# Patient Record
Sex: FEMALE | Race: WHITE | NOT HISPANIC OR LATINO | Employment: FULL TIME | URBAN - METROPOLITAN AREA
[De-identification: names, ages, dates, MRNs, and addresses within clinical notes are randomized per-mention and may not be internally consistent; named-entity substitution may affect disease eponyms.]

---

## 2017-01-27 ENCOUNTER — OFFICE VISIT (OUTPATIENT)
Dept: OBSTETRICS AND GYNECOLOGY | Facility: CLINIC | Age: 27
End: 2017-01-27

## 2017-01-27 VITALS — BODY MASS INDEX: 34.91 KG/M2 | HEIGHT: 63 IN | WEIGHT: 197 LBS

## 2017-01-27 DIAGNOSIS — Z12.4 SCREENING FOR MALIGNANT NEOPLASM OF CERVIX: ICD-10-CM

## 2017-01-27 DIAGNOSIS — Z13.9 SCREENING: ICD-10-CM

## 2017-01-27 DIAGNOSIS — Z01.419 ENCOUNTER FOR GYNECOLOGICAL EXAMINATION WITHOUT ABNORMAL FINDING: Primary | ICD-10-CM

## 2017-01-27 LAB
BILIRUB BLD-MCNC: NEGATIVE MG/DL
CLARITY, POC: CLEAR
COLOR UR: YELLOW
GLUCOSE UR STRIP-MCNC: NEGATIVE MG/DL
KETONES UR QL: NEGATIVE
LEUKOCYTE EST, POC: NEGATIVE
NITRITE UR-MCNC: NEGATIVE MG/ML
PH UR: 6 [PH] (ref 5–8)
PROT UR STRIP-MCNC: NEGATIVE MG/DL
RBC # UR STRIP: NEGATIVE /UL
SP GR UR: 1.03 (ref 1–1.03)
UROBILINOGEN UR QL: NORMAL

## 2017-01-27 PROCEDURE — 81002 URINALYSIS NONAUTO W/O SCOPE: CPT | Performed by: OBSTETRICS & GYNECOLOGY

## 2017-01-27 PROCEDURE — 99395 PREV VISIT EST AGE 18-39: CPT | Performed by: OBSTETRICS & GYNECOLOGY

## 2017-01-27 NOTE — PROGRESS NOTES
"[facility] Obstetrics and Gynecology    GYN ANNUAL EXAM:  Chief Complaint   Patient presents with   • Gynecologic Exam     last pap 10/9/15 negative       Subjective   History of Present Illness    Diana Moss is a 26 y.o. female who presents for annual exam.  Diana had an ablation of the endometrium as had no bleeding since.  She is very happy with her results.  The kids are doing well.  Her youngest is now 3 years old.  She has no gynecologic complaints today and no new medical history.    Obstetric History:  OB History      Para Term  AB TAB SAB Ectopic Multiple Living    4 3 3  1  1   3         Menstrual History:     No LMP recorded.       Sexual History:active         Family history of breast cancer: no  Family history of ovarian cancer: no  Family history of uterine cancer: no  Family History of cervical cancer: no  Family History of colon cancer/colon polyps: no  Regular self breast exam: yes  Current contraception:TUBAL LIGATION  History of abnormal Pap smear: yes - KERRI 1   Received Gardasil immunization: No  JASMIN exposure in utero: no  History of abnormal mammogram: no    The following portions of the patient's history were reviewed and updated as appropriate: allergies, current medications, past family history, past medical history, past social history, past surgical history and problem list.    Review of Systems    Pertinent items are noted in HPI.       Objective   Physical Exam    Visit Vitals   • Ht 62.5\" (158.8 cm)   • Wt 197 lb (89.4 kg)   • BMI 35.46 kg/m2       General:   alert, appears stated age and cooperative   Heart: regular rate and rhythm, S1, S2 normal, no murmur, click, rub or gallop   Lungs: clear to auscultation bilaterally   Abdomen: soft, non-tender, without masses or organomegaly   Breast: inspection negative, no nipple discharge or bleeding, no masses or nodularity palpable   Vulva: normal   Vagina: normal mucosa, normal discharge   Cervix: " no bleeding following Pap, no cervical motion tenderness and no lesions   Uterus: normal size, midline, anteverted, non-tender, mobile   Adnexa: normal adnexa and no mass, fullness, tenderness     Assessment/Plan   Diana was seen today for gynecologic exam.    Diagnoses and all orders for this visit:    Screening  -     POC Urinalysis Dipstick    Screening for malignant neoplasm of cervix  -     IGP, Rfx Aptima HPV ASCU        Thin prep Pap smear.  Mammogram.  Contraception: TUBAL LIGATION.  All questions answered.  Await pap smear results.  Follow up in 1 year.               Stacy Mckinley MD,  FACOG

## 2017-01-27 NOTE — MR AVS SNAPSHOT
Diana Mahoneybrandon   1/27/2017 2:30 PM   Office Visit    Dept Phone:  [phone]   Encounter #:  [id number]    Provider:  Stacy Mckinley MD   Department:  [facility] OB GYN                Your Full Care Plan              Today's Medication Changes          These changes are accurate as of: 1/27/17  3:09 PM.  If you have any questions, ask your nurse or doctor.               Stop taking medication(s)listed here:     amoxicillin-clavulanate 875-125 MG per tablet   Commonly known as:  AUGMENTIN   Stopped by:  Stacy Mckinley MD           cefdinir 300 MG capsule   Commonly known as:  OMNICEF   Stopped by:  Stacy Mckinley MD                      Your Updated Medication List          This list is accurate as of: 1/27/17  3:09 PM.  Always use your most recent med list.                cetirizine 10 MG tablet   Commonly known as:  zyrTEC       Chlorcyclizine-Pseudoephed 25-60 MG tablet   Commonly known as:  STAHIST AD   Take 1 tablet po tid prn       Chlorpheniramine-Codeine 2-9 MG/5ML liquid   Commonly known as:  Z-TUSS AC   Take 1-2 tsp po q6 hours prn       phentermine 37.5 MG capsule       traMADol 50 MG tablet   Commonly known as:  ULTRAM   Take 1 tablet by mouth every 4 (four) hours as needed for moderate pain (4-6).               We Performed the Following     IGP, Rfx Aptima HPV ASCU     POC Urinalysis Dipstick       You Were Diagnosed With        Codes Comments    Encounter for gynecological examination without abnormal finding    -  Primary ICD-10-CM: Z01.419  ICD-9-CM: V72.31     Screening for malignant neoplasm of cervix     ICD-10-CM: Z12.4  ICD-9-CM: V76.2     Screening     ICD-10-CM: Z13.9  ICD-9-CM: V82.9       Medications to be Given to You by a Medical Professional     Due       Frequency    8/4/2016 neomycin-polymyxin-hydrocortisone (CORTISPORIN) 1 % otic solution solution 2-3 drop  4 Times Daily      Instructions     None  "   Patient Instructions History      Upcoming Appointments     Visit Type Date Time Department    OFFICE VISIT 2017  2:30 PM MARIVEL RADFORD      [employer] Signup     [facility] [employer] allows you to send messages to your doctor, view your test results, renew your prescriptions, schedule appointments, and more. To sign up, go to [employer] and click on the Sign Up Now link in the New User? box. Enter your [employer] Activation Code exactly as it appears below along with the last four digits of your Social Security Number and your Date of Birth () to complete the sign-up process. If you do not sign up before the expiration date, you must request a new code.    [employer] Activation Code: [email or web address]  Expires: 2/10/2017  3:09 PM    If you have questions, you can email [email or web address] or call [phone] to talk to our [employer] staff. Remember, [employer] is NOT to be used for urgent needs. For medical emergencies, dial 911.               Other Info from Your Visit           Allergies     Codeine  Itching, Nausea Only, Nausea And Vomiting    Hydrocodone-acetaminophen  Nausea And Vomiting    Latex        Reason for Visit     Gynecologic Exam last pap 10/9/15 negative      Vital Signs     Height Weight Body Mass Index Smoking Status          62.5\" (158.8 cm) 197 lb (89.4 kg) 35.46 kg/m2 Former Smoker        Problems and Diagnoses Noted     Encounter for routine gynecological examination    -  Primary    Screening for cervical cancer        Screening          Results     POC Urinalysis Dipstick      Component Value Standard Range & Units    Color Yellow Yellow, Straw, Dark Yellow, Beth    Clarity, UA Clear Clear    Glucose, UA Negative Negative, 1000 mg/dL (3+) mg/dL    Bilirubin Negative Negative    Ketones, UA Negative Negative    Specific Gravity  1.030 1.005 - 1.030    Blood, UA Negative Negative    pH, Urine 6.0 5.0 - 8.0    Protein, POC Negative Negative mg/dL    " Urobilinogen, UA Normal Normal    Leukocytes Negative Negative    Nitrite, UA Negative Negative

## 2017-01-31 ENCOUNTER — TELEPHONE (OUTPATIENT)
Dept: OBSTETRICS AND GYNECOLOGY | Facility: CLINIC | Age: 27
End: 2017-01-31

## 2017-01-31 LAB
CONV .: NORMAL
CYTOLOGIST CVX/VAG CYTO: NORMAL
CYTOLOGY CVX/VAG DOC THIN PREP: NORMAL
DX ICD CODE: NORMAL
HIV 1 & 2 AB SER-IMP: NORMAL
OTHER STN SPEC: NORMAL
PATH REPORT.FINAL DX SPEC: NORMAL
STAT OF ADQ CVX/VAG CYTO-IMP: NORMAL

## 2017-01-31 NOTE — TELEPHONE ENCOUNTER
----- Message from Stacy Mckinley MD sent at 1/31/2017 11:45 AM EST -----  Congratulations your pap testing is normal      Thank you  Stacy Mckinley MD

## 2021-04-16 ENCOUNTER — BULK ORDERING (OUTPATIENT)
Dept: CASE MANAGEMENT | Facility: OTHER | Age: 31
End: 2021-04-16

## 2021-04-16 DIAGNOSIS — Z23 IMMUNIZATION DUE: ICD-10-CM

## 2023-03-07 RX ORDER — OMEPRAZOLE 20 MG/1
20 CAPSULE, DELAYED RELEASE ORAL DAILY
COMMUNITY

## 2023-03-07 RX ORDER — HYDROCODONE BITARTRATE AND ACETAMINOPHEN 5; 325 MG/1; MG/1
1 TABLET ORAL EVERY 6 HOURS PRN
COMMUNITY
End: 2023-03-20

## 2023-03-08 ENCOUNTER — HOSPITAL ENCOUNTER (OUTPATIENT)
Facility: HOSPITAL | Age: 33
Setting detail: HOSPITAL OUTPATIENT SURGERY
Discharge: HOME | End: 2023-03-08
Attending: UROLOGY | Admitting: UROLOGY
Payer: COMMERCIAL

## 2023-03-08 ENCOUNTER — ANESTHESIA EVENT (OUTPATIENT)
Dept: PERIOP | Facility: HOSPITAL | Age: 33
End: 2023-03-08
Payer: COMMERCIAL

## 2023-03-08 ENCOUNTER — ANESTHESIA (OUTPATIENT)
Dept: PERIOP | Facility: HOSPITAL | Age: 33
End: 2023-03-08
Payer: COMMERCIAL

## 2023-03-08 ENCOUNTER — APPOINTMENT (OUTPATIENT)
Dept: GENERAL RADIOLOGY | Facility: HOSPITAL | Age: 33
End: 2023-03-08
Payer: COMMERCIAL

## 2023-03-08 VITALS
RESPIRATION RATE: 16 BRPM | HEART RATE: 61 BPM | HEIGHT: 62 IN | BODY MASS INDEX: 28.34 KG/M2 | TEMPERATURE: 98.7 F | WEIGHT: 154 LBS | DIASTOLIC BLOOD PRESSURE: 78 MMHG | OXYGEN SATURATION: 99 % | SYSTOLIC BLOOD PRESSURE: 121 MMHG

## 2023-03-08 DIAGNOSIS — N23 RENAL COLIC ON LEFT SIDE: Primary | ICD-10-CM

## 2023-03-08 DIAGNOSIS — N20.1 LEFT URETERAL STONE: ICD-10-CM

## 2023-03-08 PROCEDURE — 25010000002 PROPOFOL 10 MG/ML EMULSION: Performed by: NURSE ANESTHETIST, CERTIFIED REGISTERED

## 2023-03-08 PROCEDURE — C1769 GUIDE WIRE: HCPCS | Performed by: UROLOGY

## 2023-03-08 PROCEDURE — 25010000002 DEXAMETHASONE SODIUM PHOSPHATE 20 MG/5ML SOLUTION: Performed by: NURSE ANESTHETIST, CERTIFIED REGISTERED

## 2023-03-08 PROCEDURE — 25010000002 FENTANYL CITRATE (PF) 50 MCG/ML SOLUTION: Performed by: NURSE ANESTHETIST, CERTIFIED REGISTERED

## 2023-03-08 PROCEDURE — 0 CEFAZOLIN PER 500 MG: Performed by: UROLOGY

## 2023-03-08 PROCEDURE — 25010000002 ONDANSETRON PER 1 MG: Performed by: NURSE ANESTHETIST, CERTIFIED REGISTERED

## 2023-03-08 PROCEDURE — C2617 STENT, NON-COR, TEM W/O DEL: HCPCS | Performed by: UROLOGY

## 2023-03-08 PROCEDURE — 25010000002 MIDAZOLAM PER 1 MG: Performed by: ANESTHESIOLOGY

## 2023-03-08 PROCEDURE — 74420 UROGRAPHY RTRGR +-KUB: CPT

## 2023-03-08 DEVICE — URETERAL STENT
Type: IMPLANTABLE DEVICE | Site: URETER | Status: FUNCTIONAL
Brand: CONTOUR™

## 2023-03-08 RX ORDER — CEFAZOLIN 1 G/1
2 INJECTION, POWDER, FOR SOLUTION INTRAVENOUS ONCE
Status: COMPLETED | OUTPATIENT
Start: 2023-03-08 | End: 2023-03-08

## 2023-03-08 RX ORDER — DROPERIDOL 2.5 MG/ML
0.62 INJECTION, SOLUTION INTRAMUSCULAR; INTRAVENOUS
Status: DISCONTINUED | OUTPATIENT
Start: 2023-03-08 | End: 2023-03-08 | Stop reason: HOSPADM

## 2023-03-08 RX ORDER — FAMOTIDINE 10 MG/ML
20 INJECTION, SOLUTION INTRAVENOUS ONCE
Status: DISCONTINUED | OUTPATIENT
Start: 2023-03-08 | End: 2023-03-08

## 2023-03-08 RX ORDER — PROPOFOL 10 MG/ML
VIAL (ML) INTRAVENOUS AS NEEDED
Status: DISCONTINUED | OUTPATIENT
Start: 2023-03-08 | End: 2023-03-08 | Stop reason: SURG

## 2023-03-08 RX ORDER — SODIUM CHLORIDE 0.9 % (FLUSH) 0.9 %
3-10 SYRINGE (ML) INJECTION AS NEEDED
Status: DISCONTINUED | OUTPATIENT
Start: 2023-03-08 | End: 2023-03-08 | Stop reason: HOSPADM

## 2023-03-08 RX ORDER — FENTANYL CITRATE 50 UG/ML
50 INJECTION, SOLUTION INTRAMUSCULAR; INTRAVENOUS
Status: DISCONTINUED | OUTPATIENT
Start: 2023-03-08 | End: 2023-03-08 | Stop reason: HOSPADM

## 2023-03-08 RX ORDER — FLUMAZENIL 0.1 MG/ML
0.2 INJECTION INTRAVENOUS AS NEEDED
Status: DISCONTINUED | OUTPATIENT
Start: 2023-03-08 | End: 2023-03-08 | Stop reason: HOSPADM

## 2023-03-08 RX ORDER — ONDANSETRON 2 MG/ML
4 INJECTION INTRAMUSCULAR; INTRAVENOUS ONCE AS NEEDED
Status: DISCONTINUED | OUTPATIENT
Start: 2023-03-08 | End: 2023-03-08 | Stop reason: HOSPADM

## 2023-03-08 RX ORDER — NALOXONE HCL 0.4 MG/ML
0.2 VIAL (ML) INJECTION AS NEEDED
Status: DISCONTINUED | OUTPATIENT
Start: 2023-03-08 | End: 2023-03-08 | Stop reason: HOSPADM

## 2023-03-08 RX ORDER — MIDAZOLAM HYDROCHLORIDE 1 MG/ML
1 INJECTION INTRAMUSCULAR; INTRAVENOUS
Status: DISCONTINUED | OUTPATIENT
Start: 2023-03-08 | End: 2023-03-08 | Stop reason: HOSPADM

## 2023-03-08 RX ORDER — PROMETHAZINE HYDROCHLORIDE 25 MG/1
25 TABLET ORAL ONCE AS NEEDED
Status: DISCONTINUED | OUTPATIENT
Start: 2023-03-08 | End: 2023-03-08 | Stop reason: HOSPADM

## 2023-03-08 RX ORDER — SODIUM CHLORIDE 0.9 % (FLUSH) 0.9 %
3 SYRINGE (ML) INJECTION EVERY 12 HOURS SCHEDULED
Status: DISCONTINUED | OUTPATIENT
Start: 2023-03-08 | End: 2023-03-08 | Stop reason: HOSPADM

## 2023-03-08 RX ORDER — IPRATROPIUM BROMIDE AND ALBUTEROL SULFATE 2.5; .5 MG/3ML; MG/3ML
3 SOLUTION RESPIRATORY (INHALATION) ONCE AS NEEDED
Status: DISCONTINUED | OUTPATIENT
Start: 2023-03-08 | End: 2023-03-08 | Stop reason: HOSPADM

## 2023-03-08 RX ORDER — PHENAZOPYRIDINE HYDROCHLORIDE 100 MG/1
100 TABLET, FILM COATED ORAL 3 TIMES DAILY PRN
Qty: 21 TABLET | Refills: 0 | Status: SHIPPED | OUTPATIENT
Start: 2023-03-08

## 2023-03-08 RX ORDER — HYDROCODONE BITARTRATE AND ACETAMINOPHEN 5; 325 MG/1; MG/1
1-2 TABLET ORAL EVERY 4 HOURS PRN
Qty: 20 TABLET | Refills: 0 | Status: SHIPPED | OUTPATIENT
Start: 2023-03-08 | End: 2023-03-20

## 2023-03-08 RX ORDER — MAGNESIUM HYDROXIDE 1200 MG/15ML
LIQUID ORAL AS NEEDED
Status: DISCONTINUED | OUTPATIENT
Start: 2023-03-08 | End: 2023-03-08 | Stop reason: HOSPADM

## 2023-03-08 RX ORDER — LABETALOL HYDROCHLORIDE 5 MG/ML
5 INJECTION, SOLUTION INTRAVENOUS
Status: DISCONTINUED | OUTPATIENT
Start: 2023-03-08 | End: 2023-03-08 | Stop reason: HOSPADM

## 2023-03-08 RX ORDER — HYDROCODONE BITARTRATE AND ACETAMINOPHEN 7.5; 325 MG/1; MG/1
1 TABLET ORAL ONCE AS NEEDED
Status: COMPLETED | OUTPATIENT
Start: 2023-03-08 | End: 2023-03-08

## 2023-03-08 RX ORDER — HYDROMORPHONE HYDROCHLORIDE 1 MG/ML
0.5 INJECTION, SOLUTION INTRAMUSCULAR; INTRAVENOUS; SUBCUTANEOUS
Status: DISCONTINUED | OUTPATIENT
Start: 2023-03-08 | End: 2023-03-08 | Stop reason: HOSPADM

## 2023-03-08 RX ORDER — FAMOTIDINE 10 MG/ML
INJECTION, SOLUTION INTRAVENOUS ONCE
Status: CANCELLED | OUTPATIENT
Start: 2023-03-08 | End: 2023-03-08

## 2023-03-08 RX ORDER — ONDANSETRON 4 MG/1
4 TABLET, FILM COATED ORAL DAILY PRN
Qty: 10 TABLET | Refills: 1 | Status: SHIPPED | OUTPATIENT
Start: 2023-03-08

## 2023-03-08 RX ORDER — SCOLOPAMINE TRANSDERMAL SYSTEM 1 MG/1
1 PATCH, EXTENDED RELEASE TRANSDERMAL ONCE
Status: DISCONTINUED | OUTPATIENT
Start: 2023-03-08 | End: 2023-03-08 | Stop reason: HOSPADM

## 2023-03-08 RX ORDER — DEXAMETHASONE SODIUM PHOSPHATE 4 MG/ML
INJECTION, SOLUTION INTRA-ARTICULAR; INTRALESIONAL; INTRAMUSCULAR; INTRAVENOUS; SOFT TISSUE AS NEEDED
Status: DISCONTINUED | OUTPATIENT
Start: 2023-03-08 | End: 2023-03-08 | Stop reason: SURG

## 2023-03-08 RX ORDER — EPHEDRINE SULFATE 50 MG/ML
5 INJECTION, SOLUTION INTRAVENOUS ONCE AS NEEDED
Status: DISCONTINUED | OUTPATIENT
Start: 2023-03-08 | End: 2023-03-08 | Stop reason: HOSPADM

## 2023-03-08 RX ORDER — LIDOCAINE HYDROCHLORIDE 20 MG/ML
INJECTION, SOLUTION INFILTRATION; PERINEURAL AS NEEDED
Status: DISCONTINUED | OUTPATIENT
Start: 2023-03-08 | End: 2023-03-08 | Stop reason: SURG

## 2023-03-08 RX ORDER — PROMETHAZINE HYDROCHLORIDE 25 MG/1
25 SUPPOSITORY RECTAL ONCE AS NEEDED
Status: DISCONTINUED | OUTPATIENT
Start: 2023-03-08 | End: 2023-03-08 | Stop reason: HOSPADM

## 2023-03-08 RX ORDER — HYDRALAZINE HYDROCHLORIDE 20 MG/ML
5 INJECTION INTRAMUSCULAR; INTRAVENOUS
Status: DISCONTINUED | OUTPATIENT
Start: 2023-03-08 | End: 2023-03-08 | Stop reason: HOSPADM

## 2023-03-08 RX ORDER — FENTANYL CITRATE 50 UG/ML
INJECTION, SOLUTION INTRAMUSCULAR; INTRAVENOUS AS NEEDED
Status: DISCONTINUED | OUTPATIENT
Start: 2023-03-08 | End: 2023-03-08 | Stop reason: SURG

## 2023-03-08 RX ORDER — ONDANSETRON 2 MG/ML
INJECTION INTRAMUSCULAR; INTRAVENOUS AS NEEDED
Status: DISCONTINUED | OUTPATIENT
Start: 2023-03-08 | End: 2023-03-08 | Stop reason: SURG

## 2023-03-08 RX ORDER — SODIUM CHLORIDE, SODIUM LACTATE, POTASSIUM CHLORIDE, CALCIUM CHLORIDE 600; 310; 30; 20 MG/100ML; MG/100ML; MG/100ML; MG/100ML
9 INJECTION, SOLUTION INTRAVENOUS CONTINUOUS
Status: DISCONTINUED | OUTPATIENT
Start: 2023-03-08 | End: 2023-03-08 | Stop reason: HOSPADM

## 2023-03-08 RX ORDER — GLYCOPYRROLATE 0.2 MG/ML
INJECTION INTRAMUSCULAR; INTRAVENOUS AS NEEDED
Status: DISCONTINUED | OUTPATIENT
Start: 2023-03-08 | End: 2023-03-08 | Stop reason: SURG

## 2023-03-08 RX ORDER — DIPHENHYDRAMINE HYDROCHLORIDE 50 MG/ML
12.5 INJECTION INTRAMUSCULAR; INTRAVENOUS
Status: DISCONTINUED | OUTPATIENT
Start: 2023-03-08 | End: 2023-03-08 | Stop reason: HOSPADM

## 2023-03-08 RX ORDER — OXYCODONE AND ACETAMINOPHEN 7.5; 325 MG/1; MG/1
1 TABLET ORAL EVERY 4 HOURS PRN
Status: DISCONTINUED | OUTPATIENT
Start: 2023-03-08 | End: 2023-03-08 | Stop reason: HOSPADM

## 2023-03-08 RX ORDER — CEPHALEXIN 250 MG/1
250 CAPSULE ORAL DAILY
Qty: 10 CAPSULE | Refills: 0 | Status: SHIPPED | OUTPATIENT
Start: 2023-03-08 | End: 2023-03-18

## 2023-03-08 RX ORDER — LIDOCAINE HYDROCHLORIDE 10 MG/ML
0.5 INJECTION, SOLUTION EPIDURAL; INFILTRATION; INTRACAUDAL; PERINEURAL ONCE AS NEEDED
Status: DISCONTINUED | OUTPATIENT
Start: 2023-03-08 | End: 2023-03-08 | Stop reason: HOSPADM

## 2023-03-08 RX ADMIN — HYDROCODONE BITARTRATE AND ACETAMINOPHEN 1 TABLET: 7.5; 325 TABLET ORAL at 15:01

## 2023-03-08 RX ADMIN — PROPOFOL 200 MG: 10 INJECTION, EMULSION INTRAVENOUS at 13:53

## 2023-03-08 RX ADMIN — SCOPALAMINE 1 PATCH: 1 PATCH, EXTENDED RELEASE TRANSDERMAL at 12:41

## 2023-03-08 RX ADMIN — CEFAZOLIN 2 G: 1 INJECTION, POWDER, FOR SOLUTION INTRAVENOUS at 13:44

## 2023-03-08 RX ADMIN — DEXAMETHASONE SODIUM PHOSPHATE 8 MG: 4 INJECTION, SOLUTION INTRAMUSCULAR; INTRAVENOUS at 13:58

## 2023-03-08 RX ADMIN — MIDAZOLAM 1 MG: 1 INJECTION INTRAMUSCULAR; INTRAVENOUS at 12:47

## 2023-03-08 RX ADMIN — SODIUM CHLORIDE, POTASSIUM CHLORIDE, SODIUM LACTATE AND CALCIUM CHLORIDE 9 ML/HR: 600; 310; 30; 20 INJECTION, SOLUTION INTRAVENOUS at 12:47

## 2023-03-08 RX ADMIN — GLYCOPYRROLATE 0.2 MCG: 1 INJECTION INTRAMUSCULAR; INTRAVENOUS at 14:10

## 2023-03-08 RX ADMIN — LIDOCAINE HYDROCHLORIDE 100 MG: 20 INJECTION, SOLUTION INFILTRATION; PERINEURAL at 13:53

## 2023-03-08 RX ADMIN — FENTANYL CITRATE 50 MCG: 50 INJECTION, SOLUTION INTRAMUSCULAR; INTRAVENOUS at 13:50

## 2023-03-08 RX ADMIN — ONDANSETRON 4 MG: 2 INJECTION INTRAMUSCULAR; INTRAVENOUS at 13:58

## 2023-03-08 NOTE — H&P
[facility] History and Physical    Patient Care Team:  Juan David Cummings as PCP - General (Family Medicine)    Chief complaint kidney stone left side    Subjective     Patient is a 32 y.o. female presents with history recurrent stone disease never analyzed presenting with greater than week history of left colicky flank pain irritation voiding frequency with CT scan showing 4 mm distal renal stone and hematuria no fever and chills.       Review of Systems   No fever and chills    Past Medical History:   Diagnosis Date   • Acquired deformity of leg     left knee   • Cyst of right kidney    • Endometriosis    • Kidney stone    • PONV (postoperative nausea and vomiting)    • Seasonal allergies    • Ureteral stone      Past Surgical History:   Procedure Laterality Date   • BONE GRAFT      in knee left   •  SECTION     • CHOLECYSTECTOMY     • ENDOMETRIAL ABLATION     • HYSTERECTOMY     • KNEE SURGERY     • LEG SURGERY Left    • LIPOMA EXCISION     • TUBAL ABDOMINAL LIGATION       Family History   Problem Relation Age of Onset   • Hypertension Mother    • Asthma Mother    • Hypertension Father    • Asthma Father    • Malig Hyperthermia Neg Hx      Social History     Tobacco Use   • Smoking status: Former     Types: Cigarettes     Quit date:      Years since quittin.1   • Smokeless tobacco: Never   Vaping Use   • Vaping Use: Never used   Substance Use Topics   • Alcohol use: Yes     Comment: socially   • Drug use: No       Meds:  Medications Prior to Admission   Medication Sig Dispense Refill Last Dose   • cetirizine (ZyrTEC) 10 MG tablet Take 1 tablet by mouth Daily.   3/7/2023   • HYDROcodone-acetaminophen (NORCO) 5-325 MG per tablet Take 1 tablet by mouth Every 6 (Six) Hours As Needed.   Past Week   • omeprazole (priLOSEC) 20 MG capsule Take 1 capsule by mouth Daily.   3/8/2023   • SAXENDA 18 MG/3ML solution pen-injector           Allergies:  Bactrim [sulfamethoxazole-trimethoprim], Codeine,  "Hydrocodone-acetaminophen, Latex, and Macrobid [nitrofurantoin]    Debilities:      Objective     Vital Signs  Temp:  [98.7 °F (37.1 °C)] 98.7 °F (37.1 °C)  Heart Rate:  [49] 49  Resp:  [18] 18  BP: (103)/(74) 103/74  No intake or output data in the 24 hours ending 03/08/23 1317  Flowsheet Rows    Flowsheet Row First Filed Value   Admission Height 157.5 cm (62\") Documented at 03/07/2023 1700   Admission Weight 69.9 kg (154 lb) Documented at 03/07/2023 1700           Physical Exam:      General Appearance:    Alert, cooperative, in mild distress   Head:    Normocephalic, without obvious abnormality, atraumatic   Eyes:            Lids and lashes normal, conjunctivae and sclerae normal, no   icterus, no pallor, corneas clear   Ears:    Ears appear intact with no abnormalities noted   Throat:   No oral lesions, no thrush, oral mucosa moist   Neck:   No adenopathy, supple, trachea midline, no thyromeg no JVD   Back:     No kyphosis present, no scoliosis present, no skin lesions,       erythema or scars, left CVA tenderness to percussion or                   palpation,   range of motion normal   Lungs:     respirations regular, even and                   unlabored    Heart:    Regular rhythm and normal rate   Breast Exam:    Deferred   Abdomen:    no masses, no organomegaly, soft        left lower quadrant-tender, non-distended, no guarding, no rebound                 tenderness   Genitalia:    Deferred     Results Review:    I reviewed the patient's new clinical results.  Results for orders placed or performed during the hospital encounter of 04/01/19   POCT Rapid Strep A    Specimen: Swab   Result Value Ref Range    Rapid Strep A Screen Positive (A) Negative, VALID, INVALID, Not Performed    Internal Control Passed Passed    Lot Number [id number]     Expiration Date 08/31/2020    POCT Influenza A/B    Specimen: Swab   Result Value Ref Range    Rapid Influenza A Ag Negative Negative    Rapid Influenza B Ag Negative " Negative    Internal Control Passed Passed    Lot Number [id number]     Expiration Date 43,021         Assessment:  Left 4 mm distal renal stone hydronephrosis colic inability to pass    History of recurrent nephro calculi    Plan:    Left ureteroscopy stone manipulation laser fragmentation stent placement R-B-O discussed with patient element not limited to infection bleeding complete fragmentation ancillary procedures stricture disease use of a stent she understands agrees to proceed  I discussed the patients findings and my recommendations with patient and family.     Ramos Rodrigues MD  03/08/23  13:17 EST

## 2023-03-08 NOTE — INTERVAL H&P NOTE
"H&P reviewed. The patient was examined and there are no changes to the H&P.      /74 (BP Location: Right arm, Patient Position: Lying)   Pulse (!) 49   Temp 98.7 °F (37.1 °C) (Oral)   Resp 18   Ht 157.5 cm (62\")   Wt 69.9 kg (154 lb)   LMP  (LMP Unknown)   SpO2 95%   BMI 28.17 kg/m²   "

## 2023-03-08 NOTE — ANESTHESIA PREPROCEDURE EVALUATION
Anesthesia Evaluation     history of anesthetic complications: PONV  NPO Solid Status: > 8 hours             Airway   Mallampati: II  TM distance: >3 FB  Neck ROM: full  No difficulty expected  Dental - normal exam     Pulmonary - normal exam   Cardiovascular - normal exam        Neuro/Psych  GI/Hepatic/Renal/Endo    (+)   renal disease stones,     Musculoskeletal     Abdominal    Substance History      OB/GYN          Other                        Anesthesia Plan    ASA 2     general     intravenous induction     Anesthetic plan, risks, benefits, and alternatives have been provided, discussed and informed consent has been obtained with: patient.        CODE STATUS:

## 2023-03-08 NOTE — BRIEF OP NOTE
URETEROSCOPY LASER LITHOTRIPSY WITH STENT INSERTION  Progress Note    Diana BANKS  3/8/2023    Pre-op Diagnosis:   Left UVJ stone 4 mm       Post-Op Diagnosis Codes:  Same    Procedure/CPT® Codes:        Procedure(s):  LEFT URETEROSCOPY LASER STONE STONE fragmentation and STENT PLACEMENT        Surgeon(s):  Ramos Rodrigues MD    Anesthesia: General    Staff:   Circulator: Maya Claire RN  Laser Staff: Andrei Espana         Estimated Blood Loss: None    Urine Voided: * No values recorded between 3/8/2023 12:00 AM and 3/8/2023  1:19 PM *    Specimens:                Stones fragmented too small to capture        Drains: * No LDAs found *    Findings: Impacted left UVJ stone        Complications: None          Ramos Rodrigues MD     Date: 3/8/2023  Time: 13:19 EST

## 2023-03-08 NOTE — OP NOTE
Preop diagnosis history recurrent stone disease with left 4 mm left UVJ stone colic inability to pass    Postop diagnosis Stone impacted left ureteral orifice with circumscribing edema fragmented stent placed    Procedure is a cystoscopy with left ureteroscopy holmium laser fragmentation of stone placement of a 6 Bel[place]ian by 26 cm stent without tether under fluoroscopic guidance    Surgeon Ravi    Anesthesia General    Procedure note 32-year-old with above-mentioned history and findings procedure attendant risk of been discussed understood to proceed    Site was marked antibiotics given timeout was taken COVID precautions allergies reviewed after adequate general anesthesia was placed very carefully modified dorsolithotomy position all pressure points lead prepped draped sterile fashion over genitalia 2% intraurethral lidocaine jelly administered scope advanced bladder the bladder was normal no stones in the bladder trigone however showed marked edema on the left side with a stone impacting the left UVJ attempted try to place a guidewire nonsuccessful I was able to then use the ureteroscope go up to the stone and with the use of the laser fiber was able to fragment the stone attempted trying to capture these with a Secura basket was not successful I traced the guidewire all the way up to the UPJ and no other stone or stone fragments were seen with a dilated ureter down to the UVJ because of the marked amount of edema I felt best to place a stent    Cystoscope was placed back over the guidewire and a 6 Bel[place]ian by 26 cm stent was placed without tether with good curling the upper collecting system and bladder the bladder is a partially filled the patient's procedure well and sent to the recovery in satisfactory condition findings were called to her mother Jeniffer at [phone]    Disposition my office will call for follow-up  to remove the stent in 1 week continuation of her home meds as well as a renewal of her  hydrocodone which she has tolerated in spite of her allergy list and also Zofran Pyridium and low-dose Keflex instruction sheet with phone numbers given concerns or questions to contact us accordingly outlining her activities and diet postoperatively

## 2023-03-08 NOTE — ANESTHESIA PROCEDURE NOTES
Airway  Urgency: elective    Date/Time: 3/8/2023 1:54 PM    General Information and Staff    Patient location during procedure: OR  Anesthesiologist: Moris Forte MD  CRNA/CAA: Garcia Fischer CRNA    Indications and Patient Condition  Indications for airway management: airway protection    Preoxygenated: yes  MILS maintained throughout  Mask difficulty assessment: 1 - vent by mask    Final Airway Details  Final airway type: supraglottic airway      Successful airway: unique  Size 4     Number of attempts at approach: 1  Assessment: lips, teeth, and gum same as pre-op and atraumatic intubation

## 2023-03-09 NOTE — ANESTHESIA POSTPROCEDURE EVALUATION
Patient: Diana BANKS    Procedure Summary     Date: 03/08/23 Room / Location:  [facility] /  [facility]    Anesthesia Start: 1348 Anesthesia Stop: 1431    Procedure: LEFT URETEROSCOPY LASER STONE STENT PLACEMENT (Left: Ureter) Diagnosis:     Surgeons: Ramos Rodrigues MD Provider: Moris Forte MD    Anesthesia Type: general ASA Status: 2          Anesthesia Type: general    Vitals  Vitals Value Taken Time   /81 03/08/23 1506   Temp     Pulse 69 03/08/23 1509   Resp 16 03/08/23 1505   SpO2 98 % 03/08/23 1509   Vitals shown include unvalidated device data.        Post Anesthesia Care and Evaluation      Comments: Pt. Discharged prior to being evaluated by anesthesia.  Chart is reviewed and no complications are noted.  THIS CASE IS NOT MEDICALLY DIRECTED

## 2023-03-16 NOTE — PROGRESS NOTES
SURGERY  Diana BANKS   21    Chief Complaint:  hyperparathyroidism    HPI    Ms. BANKS is a very nice 32 y.o. female who presents for evaluation of hyperparathyroidism, with 3 recent kidney stones cared for by Dr Dann Rodrigues.  2023 she had a calcium of 9.3, intact PTH of 89.1.  Vitamin D was 25.2.  Alkaline phosphatase was normal at 54.    Her symptoms are that of the above-mentioned stones, recurrent, fatigue, being candid that she looks like a 88-year-old woman at work, forgetfulness.  She has no history of pancreatitis or ulcers, no family history of hyperparathyroidism.  She did get a gastric sleeve and has lost about 85 pounds.  She has not had a DEXA scan and does not have any films that suggest osteopenia.    Past Medical History:   Diagnosis Date   • Acquired deformity of leg     left knee   • Cyst of right kidney    • Endometriosis    • Kidney stone    • PONV (postoperative nausea and vomiting)    • Seasonal allergies    • Ureteral stone      Past Surgical History:   Procedure Laterality Date   • BONE GRAFT      in knee left   •  SECTION     • CHOLECYSTECTOMY     • ENDOMETRIAL ABLATION     • HYSTERECTOMY     • KNEE SURGERY     • LEG SURGERY Left    • LIPOMA EXCISION     • TUBAL ABDOMINAL LIGATION     • URETEROSCOPY LASER LITHOTRIPSY WITH STENT INSERTION Left 3/8/2023    Procedure: LEFT URETEROSCOPY LASER STONE STENT PLACEMENT;  Surgeon: Ramos Rodrigues MD;  Location: [facility];  Service: Urology;  Laterality: Left;     Family History   Problem Relation Age of Onset   • Hypertension Mother    • Asthma Mother    • Hypertension Father    • Asthma Father    • Malig Hyperthermia Neg Hx      Social History     Socioeconomic History   • Marital status:    Tobacco Use   • Smoking status: Former     Types: Cigarettes     Quit date:      Years since quittin.2   • Smokeless tobacco: Never   Vaping Use   • Vaping Use: Never used   Substance and Sexual  "Activity   • Alcohol use: Yes     Comment: socially   • Drug use: No   • Sexual activity: Yes     Partners: Male     Birth control/protection: Surgical     Comment: tubal         Current Outpatient Medications:   •  cephalexin (KEFLEX) 250 MG capsule, Take 1 capsule by mouth Daily for 10 days., Disp: 10 capsule, Rfl: 0  •  cetirizine (ZyrTEC) 10 MG tablet, Take 1 tablet by mouth Daily., Disp: , Rfl:   •  HYDROcodone-acetaminophen (NORCO) 5-325 MG per tablet, Take 1 tablet by mouth Every 6 (Six) Hours As Needed., Disp: , Rfl:   •  HYDROcodone-acetaminophen (NORCO) 5-325 MG per tablet, Take 1-2 tablets by mouth Every 4 (Four) Hours As Needed (Pain)., Disp: 20 tablet, Rfl: 0  •  omeprazole (priLOSEC) 20 MG capsule, Take 1 capsule by mouth Daily., Disp: , Rfl:   •  ondansetron (Zofran) 4 MG tablet, Take 1 tablet by mouth Daily As Needed for Nausea or Vomiting., Disp: 10 tablet, Rfl: 1  •  phenazopyridine (PYRIDIUM) 100 MG tablet, Take 1 tablet by mouth 3 (Three) Times a Day As Needed for Bladder Spasms. This medication will make your urine orange or red and will stain clothes, Disp: 21 tablet, Rfl: 0    Allergies   Allergen Reactions   • Bactrim [Sulfamethoxazole-Trimethoprim] Itching   • Codeine Itching, Nausea Only and Nausea And Vomiting   • Hydrocodone-Acetaminophen Nausea And Vomiting   • Latex Itching   • Macrobid [Nitrofurantoin] Rash       PHYSICAL EXAM:    Ht 157.5 cm (62\")   Wt 70.8 kg (156 lb)   LMP  (LMP Unknown)   BMI 28.53 kg/m²       Constitutional: well developed, well nourished, appears  healthy, stated age  ENMT: Hearing intact, neck without masses, incision would be placed at a very low site, that is notable only when she tilts her head back fairly considerably  CVS: RRR, no murmur  Respiratory: CTA, normal respiratory effort   Gastrointestinal: abdomen soft, nontender, abdominal hernia not detected  Musculoskeletal: gait normal, muscle mass normal  Neurological: awake and alert, seems to have " reasonable capacity for understanding for medical decision making  Psychiatric: appears to have reasonable judgement, pleasant    Radiographic/Lab Findings:   As above.  No parathyroid imaging.  No urine studies.    Reviewed: Parathyroid pamphlet    IMPRESSION:  · Hyperparathyroidism, primary  · Recurrent kidney stones  · Fatigue and forgetfulness    PLAN:  · SPECT-CT scan.  This will be her best way to get an idea as to a single adenoma.  The risk of parathyroid surgery were discussed with the patient including bleeding, infection, recurrent laryngeal nerve injury, hypocalcemia potentially necessitating temporary or permanent supplementation with calcium and/or activated vitamin D, with repeated labs.  We also discussed the accuracy rate of pre op studies not being ideal and the potential that the affected gland may not be located and hypercalcemia may remain.  Of course, scarring will be present.  · We discussed the intended outpatient nature of this procedure but if the gland cannot be found in the expected location, and/or bilateral exploration is need, there is the possibility of the need for an overnight stay.  In this case, supplementation of a more complex nature will be more likely and for a more extended period.  · Pending a positive SPECT-CT scan will need injection the day of surgery  · May require cessation of Prilosec postop if hypocalcemia is too notable.  · Patient comfortable to proceed with scheduling of surgery after SPECT-CT scan regardless of the findings, recognizing that the consent will say bilateral exploration regardless.  · If SPECT-CT scan is negative, will get urine studies and lab studies within the same 24 hours for a creatinine to calcium clearance ratio and get an ultrasound of the neck prior to going to the OR.    Dulce Cardozo MD  16:36 EDT      In order to provide a more personal and interactive patient experience as well as improve efficiency, this note was started prior to the  office visit, including review of past history and pertinent images, surgeries.    ADDEND  03/30/23  SPECT CT negative.  We need to get labs like we talked about in the office, urine within same 24 hour period and also a thyroid US.  Orders in.    ADDEND  Renal panel normal.  Calcium, both routine and ionized, phos, magnesium, albumin all normal.   Await urine and US neck.  04/04/23    ADDEND  Diana JUSTEN JOCELYN's calcium to creatinine clearance ratio is 0.009, below the discriminating value of 0.0115 for primary hyperparathyroidism, with 80% of individuals with FHH having a value <0.01.  Those with vit d deficiency can have a lower ratio while still having PHP, which is her case.  With her PTH being so elevated and her difficulty with stones, i would be inclined to still explore her despite some conflicting data.  Await US.  04/05/23     ADDEND  US neck negative.  Phoned patient and let her know that all studies are negative except her PTH being elevated at [facility].  Would be willing to explore but counseled that it might be a negative exploration with persistent/recurrent hyperparathyroidism.  She is undergoing work up for lupus.  I suggested that she continue with that.  Ordered repeat intact PTH and calcium.

## 2023-03-17 PROBLEM — Z90.3 S/P GASTRIC SLEEVE PROCEDURE: Status: ACTIVE | Noted: 2022-11-14

## 2023-03-17 PROBLEM — N92.1 METRORRHAGIA: Status: ACTIVE | Noted: 2019-11-14

## 2023-03-17 PROBLEM — N20.0 KIDNEY STONE: Status: ACTIVE | Noted: 2023-03-17

## 2023-03-17 PROBLEM — D17.22 BENIGN LIPOMATOUS NEOPLASM OF SKIN AND SUBCUTANEOUS TISSUE OF LEFT ARM: Status: ACTIVE | Noted: 2019-02-14

## 2023-03-17 PROBLEM — R91.1 PULMONARY NODULE: Status: ACTIVE | Noted: 2023-02-24

## 2023-03-17 PROBLEM — K76.89 HEPATIC FOCAL NODULAR HYPERPLASIA: Status: ACTIVE | Noted: 2022-11-14

## 2023-03-17 PROBLEM — E21.3 HYPERPARATHYROIDISM (HCC): Status: ACTIVE | Noted: 2023-03-17

## 2023-03-17 PROBLEM — Q76.49 TRANSITIONAL VERTEBRA: Status: ACTIVE | Noted: 2023-02-24

## 2023-03-17 PROBLEM — Z80.0 FAMILY HISTORY OF ESOPHAGEAL CARCINOMA: Status: ACTIVE | Noted: 2023-02-24

## 2023-03-19 PROBLEM — E21.3 HYPERPARATHYROIDISM (HCC): Status: ACTIVE | Noted: 2023-03-19

## 2023-03-20 ENCOUNTER — OFFICE VISIT (OUTPATIENT)
Dept: SURGERY | Facility: CLINIC | Age: 33
End: 2023-03-20
Payer: COMMERCIAL

## 2023-03-20 VITALS — WEIGHT: 156 LBS | HEIGHT: 62 IN | BODY MASS INDEX: 28.71 KG/M2

## 2023-03-20 DIAGNOSIS — E21.3 HYPERPARATHYROIDISM: Primary | ICD-10-CM

## 2023-03-20 PROCEDURE — 99204 OFFICE O/P NEW MOD 45 MIN: CPT | Performed by: SURGERY

## 2023-03-20 RX ORDER — ERGOCALCIFEROL 1.25 MG/1
50000 CAPSULE ORAL
COMMUNITY
Start: 2023-02-27

## 2023-03-20 RX ORDER — CYANOCOBALAMIN 1000 UG/ML
INJECTION, SOLUTION INTRAMUSCULAR; SUBCUTANEOUS
COMMUNITY
Start: 2023-01-02

## 2023-03-20 RX ORDER — TAMSULOSIN HYDROCHLORIDE 0.4 MG/1
1 CAPSULE ORAL DAILY
COMMUNITY
Start: 2023-03-04

## 2023-03-29 ENCOUNTER — HOSPITAL ENCOUNTER (OUTPATIENT)
Dept: NUCLEAR MEDICINE | Facility: HOSPITAL | Age: 33
Discharge: HOME | End: 2023-03-29
Payer: COMMERCIAL

## 2023-03-29 DIAGNOSIS — E21.3 HYPERPARATHYROIDISM: ICD-10-CM

## 2023-03-29 PROCEDURE — A9500 TC99M SESTAMIBI: HCPCS | Performed by: SURGERY

## 2023-03-29 PROCEDURE — 0 TECHNETIUM SESTAMIBI: Performed by: SURGERY

## 2023-03-29 PROCEDURE — 78072 PARATHYRD PLANAR W/SPECT&CT: CPT

## 2023-03-29 RX ADMIN — TECHNETIUM TC 99M SESTAMIBI 1 DOSE: 1 INJECTION INTRAVENOUS at 11:22

## 2023-04-03 ENCOUNTER — LAB (OUTPATIENT)
Dept: LAB | Facility: HOSPITAL | Age: 33
End: 2023-04-03
Payer: COMMERCIAL

## 2023-04-03 DIAGNOSIS — E21.3 HYPERPARATHYROIDISM: ICD-10-CM

## 2023-04-03 LAB
ALBUMIN SERPL-MCNC: 4.1 G/DL (ref 3.5–5.2)
ANION GAP SERPL CALCULATED.3IONS-SCNC: 7 MMOL/L (ref 5–15)
BUN SERPL-MCNC: 15 MG/DL (ref 6–20)
BUN/CREAT SERPL: 18.8 (ref 7–25)
CA-I BLD-MCNC: 5.1 MG/DL (ref 4.6–5.4)
CA-I SERPL ISE-MCNC: 1.28 MMOL/L (ref 1.15–1.35)
CALCIUM SPEC-SCNC: 9 MG/DL (ref 8.6–10.5)
CHLORIDE SERPL-SCNC: 106 MMOL/L (ref 98–107)
CO2 SERPL-SCNC: 27 MMOL/L (ref 22–29)
CREAT SERPL-MCNC: 0.8 MG/DL (ref 0.57–1)
EGFRCR SERPLBLD CKD-EPI 2021: 99.3 ML/MIN/1.73
GLUCOSE SERPL-MCNC: 91 MG/DL (ref 65–99)
MAGNESIUM SERPL-MCNC: 2 MG/DL (ref 1.6–2.6)
PHOSPHATE SERPL-MCNC: 3.6 MG/DL (ref 2.5–4.5)
POTASSIUM SERPL-SCNC: 4.1 MMOL/L (ref 3.5–5.2)
SODIUM SERPL-SCNC: 140 MMOL/L (ref 136–145)

## 2023-04-03 PROCEDURE — 82330 ASSAY OF CALCIUM: CPT

## 2023-04-03 PROCEDURE — 83735 ASSAY OF MAGNESIUM: CPT

## 2023-04-03 PROCEDURE — 80069 RENAL FUNCTION PANEL: CPT

## 2023-04-03 PROCEDURE — 36415 COLL VENOUS BLD VENIPUNCTURE: CPT

## 2023-04-04 ENCOUNTER — TELEPHONE (OUTPATIENT)
Dept: SURGERY | Facility: CLINIC | Age: 33
End: 2023-04-04
Payer: COMMERCIAL

## 2023-04-04 NOTE — TELEPHONE ENCOUNTER
----- Message from Dulce Cardozo MD sent at 3/30/2023  2:38 PM EDT -----  Please let her know that her SPECT CT was negative.  This doesn't mean she doesn't have a parathyroid adenoma, just that they didn't see it.  We need to get labs like we talked about in the office, urine within same 24 hour period and also a thyroid US.  Orders in.

## 2023-04-04 NOTE — TELEPHONE ENCOUNTER
Patient calling to schedule surgery. States you had mentioned regardless of SPECT results would still need surgery and would like to go ahead and try to get that scheduled. Can you place orders. She had her labs drawn yesterday, turning in her 24hour urine sample today, and has US tomorrow morning.

## 2023-04-05 ENCOUNTER — LAB (OUTPATIENT)
Dept: LAB | Facility: HOSPITAL | Age: 33
End: 2023-04-05
Payer: COMMERCIAL

## 2023-04-05 ENCOUNTER — HOSPITAL ENCOUNTER (OUTPATIENT)
Dept: ULTRASOUND IMAGING | Facility: HOSPITAL | Age: 33
Discharge: HOME | End: 2023-04-05
Payer: COMMERCIAL

## 2023-04-05 DIAGNOSIS — E21.3 HYPERPARATHYROIDISM: ICD-10-CM

## 2023-04-05 LAB
CALCIUM 24H UR-MCNC: 16.6 MG/DL
CALCIUM 24H UR-MRATE: 116.2 MG/24 HR (ref 100–300)
COLLECT DURATION TIME UR: 24 HRS
COLLECT DURATION TIME UR: 24 HRS
CREAT UR-MCNC: 163 MG/DL
CREATINE 24H UR-MRATE: 1.14 G/24 HR (ref 0.7–1.6)
SPECIMEN VOL 24H UR: 700 ML
SPECIMEN VOL 24H UR: 700 ML

## 2023-04-05 PROCEDURE — 76536 US EXAM OF HEAD AND NECK: CPT

## 2023-04-05 PROCEDURE — 81050 URINALYSIS VOLUME MEASURE: CPT

## 2023-04-05 PROCEDURE — 82340 ASSAY OF CALCIUM IN URINE: CPT

## 2023-04-05 PROCEDURE — 82570 ASSAY OF URINE CREATININE: CPT

## 2023-04-06 ENCOUNTER — LAB (OUTPATIENT)
Dept: LAB | Facility: HOSPITAL | Age: 33
End: 2023-04-06
Payer: COMMERCIAL

## 2023-04-06 DIAGNOSIS — E21.3 HYPERPARATHYROIDISM: ICD-10-CM

## 2023-04-06 LAB
CALCIUM SPEC-SCNC: 9 MG/DL (ref 8.6–10.5)
PTH-INTACT SERPL-MCNC: 35.5 PG/ML (ref 15–65)

## 2023-04-06 PROCEDURE — 36415 COLL VENOUS BLD VENIPUNCTURE: CPT

## 2023-04-06 PROCEDURE — 83970 ASSAY OF PARATHORMONE: CPT

## 2023-04-06 PROCEDURE — 82310 ASSAY OF CALCIUM: CPT

## (undated) DEVICE — TIDISHIELD UROLOGY DRAIN BAGS FROSTY VINYL STERILE FITS SIEMENS UROSKOP ACCESS 20 PER CASE: Brand: TIDISHIELD

## (undated) DEVICE — NITINOL WIRE WITH HYDROPHILIC TIP: Brand: SENSOR

## (undated) DEVICE — FIBR LASR FLEXIVAPULSE365 HOLMIUM FLT/TP 1P/U

## (undated) DEVICE — STONE RETRIEVAL BASKET: Brand: SEGURA HEMISPHERE

## (undated) DEVICE — PK URETSCP 40

## (undated) DEVICE — GLV SURG SIGNATURE ESSENTIAL PF LTX SZ8

## (undated) DEVICE — SYS IRR PUMP SGL ACTN VAC SYR 10CC